# Patient Record
Sex: MALE | Race: WHITE | Employment: PART TIME | URBAN - METROPOLITAN AREA
[De-identification: names, ages, dates, MRNs, and addresses within clinical notes are randomized per-mention and may not be internally consistent; named-entity substitution may affect disease eponyms.]

---

## 2019-06-08 ENCOUNTER — HOSPITAL ENCOUNTER (OUTPATIENT)
Dept: GENERAL RADIOLOGY | Age: 19
Discharge: HOME | End: 2019-06-08
Payer: COMMERCIAL

## 2019-06-08 ENCOUNTER — HOSPITAL ENCOUNTER (OUTPATIENT)
Age: 19
Discharge: HOME | End: 2019-06-08
Payer: COMMERCIAL

## 2019-06-08 DIAGNOSIS — S90.31XS CONTUSION OF RIGHT FOOT, SEQUELA: ICD-10-CM

## 2019-06-08 DIAGNOSIS — S93.401A SPRAIN OF RIGHT ANKLE, UNSPECIFIED LIGAMENT, INITIAL ENCOUNTER: ICD-10-CM

## 2019-06-08 PROCEDURE — 73630 X-RAY EXAM OF FOOT: CPT

## 2019-06-08 PROCEDURE — 73610 X-RAY EXAM OF ANKLE: CPT

## 2019-06-13 ENCOUNTER — OFFICE VISIT (OUTPATIENT)
Dept: ORTHOPEDIC SURGERY | Age: 19
End: 2019-06-13
Payer: COMMERCIAL

## 2019-06-13 VITALS
HEIGHT: 72 IN | HEART RATE: 73 BPM | WEIGHT: 150 LBS | BODY MASS INDEX: 20.32 KG/M2 | OXYGEN SATURATION: 98 % | RESPIRATION RATE: 16 BRPM

## 2019-06-13 DIAGNOSIS — S92.324A CLOSED NONDISPLACED FRACTURE OF SECOND METATARSAL BONE OF RIGHT FOOT, INITIAL ENCOUNTER: ICD-10-CM

## 2019-06-13 DIAGNOSIS — S92.334A CLOSED NONDISPLACED FRACTURE OF THIRD METATARSAL BONE OF RIGHT FOOT, INITIAL ENCOUNTER: ICD-10-CM

## 2019-06-13 PROCEDURE — 99024 POSTOP FOLLOW-UP VISIT: CPT | Performed by: ORTHOPAEDIC SURGERY

## 2019-06-13 PROCEDURE — 28470 CLTX METATARSAL FX WO MNP EA: CPT | Performed by: ORTHOPAEDIC SURGERY

## 2019-06-13 NOTE — PROGRESS NOTES
ORTHOPEDIC NEW PATIENT NOTE      2019    Patient name: Radha Sherwood  : 2000    CHIEF COMPLAINT  Chief Complaint   Patient presents with    Fracture     right 2nd and 3ed metatarsal       HPI  The patient was seen and examined. Joshua Guadalupe is a 19 y.o. male who presents s/p metal plates falling on the dorsum of his foot at work when they were getting ready for a production. Mechanism of injury direct blow  Severity 3/10  Character achy  He went to urgent care and they called him after the fx read and he obtained crutches but hasnt been in a splint or boot     PAST MEDICAL HISTORY  No past medical history on file. CURRENT MEDICATIONS  Prior to Admission medications    Not on File       ALLERGIES  Allergies not on file    SURGICAL HISTORY  No past surgical history on file. FAMILY HISTORY  No family history on file.     SOCIAL HISTORY  Social History     Socioeconomic History    Marital status: Single     Spouse name: Not on file    Number of children: Not on file    Years of education: Not on file    Highest education level: Not on file   Occupational History    Not on file   Social Needs    Financial resource strain: Not on file    Food insecurity:     Worry: Not on file     Inability: Not on file    Transportation needs:     Medical: Not on file     Non-medical: Not on file   Tobacco Use    Smoking status: Not on file   Substance and Sexual Activity    Alcohol use: Not on file    Drug use: Not on file    Sexual activity: Not on file   Lifestyle    Physical activity:     Days per week: Not on file     Minutes per session: Not on file    Stress: Not on file   Relationships    Social connections:     Talks on phone: Not on file     Gets together: Not on file     Attends Spiritism service: Not on file     Active member of club or organization: Not on file     Attends meetings of clubs or organizations: Not on file     Relationship status: Not on file    Intimate partner violence: Fear of current or ex partner: Not on file     Emotionally abused: Not on file     Physically abused: Not on file     Forced sexual activity: Not on file   Other Topics Concern    Not on file   Social History Narrative    Not on file       REVIEW OF SYSTEMS   Review of Systems - General ROS: negative    PHYSICAL EXAM  VITAL SIGNS: There were no vitals taken for this visit. General Appearance Alert, well appearing, No acute distress   Eyes clear   Ears, Nose, Throat clear    Neck Supple, non tender   Respiratory Lungs: clear breath sounds bilateral   Cardiovascular Heart regular rate and rythm   Gastrointestinal Abdomen: soft, non-tender, non-distended   Lymphatics No adenopathy   Musculoskeletal Right knee nontender full rom  Right ankle   Right foot swollen over dorsum  Tender over shaft of second and base of 3rd. No tenderness at 4 or 5th TMTJ  Normal pulses  Sensation intact   Skin Normal. No rash or lesions   Neurological Awake, alert and oriented. No focal deficits. Motor and Sensory intact   Psychiatric Normal       LABS   No results for input(s): WBC, HEMOGLOBIN, HCT, PLT, NA, K, CL, CO2, BUN, CREATININE, CALCIUM, PHOS, ALBUMIN, MG, INR, PTT, CKMB, TROPONINI, AST, ALT, LIPASE, LACTATE, TSH in the last 72 hours. Invalid input(s): GLU, PT, CK1, TBILI, URINE      IMAGING  xrays of foot show nondisplaced fx of 2 mt shaft and 3rd mt base    ASSESSMENT     Patient Active Problem List   Diagnosis    Closed nondisplaced fracture of third metatarsal bone of right foot    Closed nondisplaced fracture of second metatarsal bone of right foot        19 y.o. male with nondisplaced right second and 3rd metartarsal fracture  PLAN   1.  Closed tx without manipulation 2nd metatarsal shaft fracture right  2.  Closed tx without manipulation 3rd metatarsal base fracture right  Weight bear in fracture boot as tolerated  Fracture boot  Follow up in 6 weeks  Ok to RTW next week if they allow fx boot       Electronically signed by: Melba Lea MD, 6/13/2019 3:15 PM

## 2019-06-13 NOTE — PROGRESS NOTES
Radiology Report    Name: Raynard Burkitt  YOB: 2000  Procedure: right foot  Date: 6/13/2019  Comparison: 6/8/19  Reason for X-ray:   Patient Active Problem List   Diagnosis    Closed nondisplaced fracture of third metatarsal bone of right foot    Closed nondisplaced fracture of second metatarsal bone of right foot      Reading: no change in alignment of fx of the 2 MT shaft and 3 MT base  Impression: nondisplaced fxs 2nd and 3rd metatarsal    Electronically signed by: Todd Raya MD, 6/13/2019 3:30 PM

## 2020-01-28 ENCOUNTER — TELEPHONE (OUTPATIENT)
Dept: ORTHOPEDIC SURGERY | Age: 20
End: 2020-01-28